# Patient Record
(demographics unavailable — no encounter records)

---

## 2025-07-21 NOTE — ADDENDUM
[FreeTextEntry1] : Patient received Tdap (Boostrix) and pneumonia (Capvaxive) vaccinations as ordered by Dr. Thomas. Prior to administration, reviewed Tdap and pneumonia (PCV) VIS with patient who verbalized understanding and consent. Patient denied severe allergic reaction to any prior vaccinations. Patient denied experiencing coma, decreased LOC, or prolonged seizures within seven days after any previous vaccine protecting against pertussis. Patient denied history of Guillain-[place] Syndrome, seizures, or another nervous system problem. Patient also denied experiencing severe pain or swelling after any previous vaccine protecting against tetanus or diphtheria.   Patient tolerated Tdap vaccination well IM in right deltoid and tolerated pneumonia vaccination well in left deltoid. No immediate adverse reaction noted. Patient provided Tdap and pneumonia (PCV) VIS for home review as per protocol.  Boostrix  : [employer]; NDC: [address]-92; Lot: [id number]; Exp: 10/24/2027  Capvaxive : [employer]; NDC: [address]-76; Lot: [id number]; Exp: 09/11/2026   Arlene Flower RN

## 2025-07-21 NOTE — HEALTH RISK ASSESSMENT
[Yes] : Yes [2 - 4 times a month (2 pts)] : 2-4 times a month (2 points) [1 or 2 (0 pts)] : 1 or 2 (0 points) [No] : In the past 12 months have you used drugs other than those required for medical reasons? No [No falls in past year] : Patient reported no falls in the past year [0] : 2) Feeling down, depressed, or hopeless: Not at all (0) [PHQ-2 Negative - No further assessment needed] : PHQ-2 Negative - No further assessment needed [With Family] : lives with family [# of Members in Household ___] :  household currently consist of [unfilled] member(s) [Unemployed] : unemployed [College] : College [] :  [# Of Children ___] : has [unfilled] children [Never] : Never [Good] : ~his/her~  mood as  good [Never (0 pts)] : Never (0 points) [Patient reported mammogram was normal] : Patient reported mammogram was normal [Patient reported PAP Smear was normal] : Patient reported PAP Smear was normal [Patient reported colonoscopy was normal] : Patient reported colonoscopy was normal [HIV test declined] : HIV test declined [Hepatitis C test declined] : Hepatitis C test declined [Fully functional (bathing, dressing, toileting, transferring, walking, feeding)] : Fully functional (bathing, dressing, toileting, transferring, walking, feeding) [Fully functional (using the telephone, shopping, preparing meals, housekeeping, doing laundry, using] : Fully functional and needs no help or supervision to perform IADLs (using the telephone, shopping, preparing meals, housekeeping, doing laundry, using transportation, managing medications and managing finances) [Reports normal functional visual acuity (ie: able to read med bottle)] : Reports normal functional visual acuity [Audit-CScore] : 2 [de-identified] : Weight, bike, walk  [de-identified] : Good - mostly vegetarian  [[id number]] : 0 [Change in mental status noted] : No change in mental status noted [Reports changes in hearing] : Reports no changes in hearing [Reports changes in vision] : Reports no changes in vision [Reports changes in dental health] : Reports no changes in dental health [MammogramDate] : 10/24 [PapSmearDate] : 07/24 [ColonoscopyDate] : 01/21 [ColonoscopyComments] : f/u in 5yrs  [FreeTextEntry3] : 15 and 17 year old boys

## 2025-07-21 NOTE — ADDENDUM
[FreeTextEntry1] : Patient received Tdap (Boostrix) and pneumonia (Capvaxive) vaccinations as ordered by Dr. Thomas. Prior to administration, reviewed Tdap and pneumonia (PCV) VIS with patient who verbalized understanding and consent. Patient denied severe allergic reaction to any prior vaccinations. Patient denied experiencing coma, decreased LOC, or prolonged seizures within seven days after any previous vaccine protecting against pertussis. Patient denied history of Guillain-[place] Syndrome, seizures, or another nervous system problem. Patient also denied experiencing severe pain or swelling after any previous vaccine protecting against tetanus or diphtheria.   Patient tolerated Tdap vaccination well IM in right deltoid and tolerated pneumonia vaccination well in left deltoid. No immediate adverse reaction noted. Patient provided Tdap and pneumonia (PCV) VIS for home review as per protocol.  Boostrix  : [employer]; NDC: [address]-48; Lot: [id number]; Exp: 10/24/2027  Capvaxive : [employer]; NDC: [address]-78; Lot: [id number]; Exp: 09/11/2026   Arlene Flower RN

## 2025-07-21 NOTE — HISTORY OF PRESENT ILLNESS
[FreeTextEntry1] : Annual  [de-identified] : 52-year-old female presenting to [facility] care and for an annual physical exam. Dr. Gelacio zayas - last seen June 2025.  PMH: Anxiety and depression (10-15yrs)  PSH: C-seciton (2), abdominoplasty 3yrs ago  FMH: Mother - uterine cancer, ovarian cyst. Maternal aunt - colon cancer at 50yo, maternal grandfather - colon cancer, Maternal grandmother - bladder cancer mets, Father - DM, HTN, HLD.  Allergies: NKDA   Therapist: Alena Clark  Psychiatrist: Dr. Tarsha Alejandra  Dermatologist: Dr. Gibson.  GI: [facility]  - [facility]  Gynecologist: Dr. Kristie zayas

## 2025-07-21 NOTE — ASSESSMENT
[Vaccines Reviewed] : Immunizations reviewed today. Please see immunization details in the vaccine log within the immunization flowsheet.  [FreeTextEntry1] : 52-year-old female presented for an annual physical exam.  routine blood work today, blood collected in the office. up to date with screening.  EKG: Sinus rhythm tdap and pcv21 today  will call back with results.

## 2025-07-21 NOTE — HISTORY OF PRESENT ILLNESS
[FreeTextEntry1] : Annual  [de-identified] : 52-year-old female presenting to [facility] care and for an annual physical exam. Dr. Gelacio zayas - last seen June 2025.  PMH: Anxiety and depression (10-15yrs)  PSH: C-seciton (2), abdominoplasty 3yrs ago  FMH: Mother - uterine cancer, ovarian cyst. Maternal aunt - colon cancer at 50yo, maternal grandfather - colon cancer, Maternal grandmother - bladder cancer mets, Father - DM, HTN, HLD.  Allergies: NKDA   Therapist: Alena Clark  Psychiatrist: Dr. Tarsha Alejandra  Dermatologist: Dr. Gibson.  GI: [facility]  - [facility]  Gynecologist: Dr. Kristie zayas